# Patient Record
Sex: MALE | Race: WHITE | NOT HISPANIC OR LATINO | Employment: FULL TIME | URBAN - METROPOLITAN AREA
[De-identification: names, ages, dates, MRNs, and addresses within clinical notes are randomized per-mention and may not be internally consistent; named-entity substitution may affect disease eponyms.]

---

## 2023-07-10 LAB — URINE CULTURE: NORMAL

## 2023-08-08 ENCOUNTER — TELEMEDICINE (OUTPATIENT)
Dept: PRIMARY CARE | Facility: CLINIC | Age: 83
End: 2023-08-08
Payer: MEDICARE

## 2023-08-08 DIAGNOSIS — F41.9 ANXIETY: Primary | ICD-10-CM

## 2023-08-08 NOTE — PROGRESS NOTES
Pt was unable to connect to video.  I called patient and informed him to call previous PCP, or new PCP or go to urgent care for issues with his anxiety med.    Farzana Caro, DO

## 2023-08-24 ENCOUNTER — APPOINTMENT (OUTPATIENT)
Dept: PRIMARY CARE | Facility: CLINIC | Age: 83
End: 2023-08-24
Payer: MEDICARE

## 2023-09-01 PROBLEM — R30.0 DYSURIA: Status: ACTIVE | Noted: 2023-09-01

## 2023-09-01 PROBLEM — N13.8 BPH WITH OBSTRUCTION/LOWER URINARY TRACT SYMPTOMS: Status: ACTIVE | Noted: 2023-09-01

## 2023-09-01 PROBLEM — R39.15 URGENCY OF URINATION: Status: ACTIVE | Noted: 2023-09-01

## 2023-09-01 PROBLEM — I10 ESSENTIAL HYPERTENSION: Status: ACTIVE | Noted: 2023-09-01

## 2023-09-01 PROBLEM — R33.9 URINARY RETENTION: Status: ACTIVE | Noted: 2023-09-01

## 2023-09-01 PROBLEM — N20.0 NEPHROLITHIASIS: Status: ACTIVE | Noted: 2023-09-01

## 2023-09-01 PROBLEM — B36.9 FUNGAL DERMATITIS: Status: ACTIVE | Noted: 2023-09-01

## 2023-09-01 PROBLEM — N40.1 BPH WITH OBSTRUCTION/LOWER URINARY TRACT SYMPTOMS: Status: ACTIVE | Noted: 2023-09-01

## 2023-09-01 PROBLEM — I11.9 LVH (LEFT VENTRICULAR HYPERTROPHY) DUE TO HYPERTENSIVE DISEASE: Status: ACTIVE | Noted: 2023-09-01

## 2023-09-01 PROBLEM — C61 CARCINOMA OF PROSTATE (MULTI): Status: ACTIVE | Noted: 2023-09-01

## 2023-09-01 PROBLEM — H93.11 TINNITUS OF RIGHT EAR: Status: ACTIVE | Noted: 2023-09-01

## 2023-09-01 PROBLEM — N35.919 URETHRAL STRICTURE: Status: ACTIVE | Noted: 2023-09-01

## 2023-09-01 PROBLEM — K22.2 LOWER ESOPHAGEAL RING (SCHATZKI): Status: ACTIVE | Noted: 2023-09-01

## 2023-09-01 PROBLEM — J21.9 BRONCHIOLITIS: Status: ACTIVE | Noted: 2023-09-01

## 2023-09-01 PROBLEM — R60.0 BILATERAL LOWER EXTREMITY EDEMA: Status: ACTIVE | Noted: 2023-09-01

## 2023-09-01 PROBLEM — F41.9 ANXIETY: Status: ACTIVE | Noted: 2023-09-01

## 2023-09-01 RX ORDER — TAMSULOSIN HYDROCHLORIDE 0.4 MG/1
1 CAPSULE ORAL NIGHTLY
COMMUNITY
Start: 2022-06-14

## 2023-09-01 RX ORDER — SERTRALINE HYDROCHLORIDE 25 MG/1
1 TABLET, FILM COATED ORAL
COMMUNITY
Start: 2023-08-22

## 2023-09-01 RX ORDER — FLUOXETINE 10 MG/1
1 TABLET ORAL
COMMUNITY
Start: 2023-08-08 | End: 2023-11-06

## 2023-09-01 RX ORDER — CLONAZEPAM 0.5 MG/1
1 TABLET ORAL NIGHTLY PRN
COMMUNITY
Start: 2023-08-22 | End: 2023-10-09 | Stop reason: ALTCHOICE

## 2023-09-01 RX ORDER — AMLODIPINE BESYLATE 5 MG/1
1 TABLET ORAL DAILY
COMMUNITY

## 2023-09-01 RX ORDER — ALPRAZOLAM 0.25 MG/1
1 TABLET ORAL 2 TIMES DAILY PRN
COMMUNITY
Start: 2023-04-19

## 2023-09-01 RX ORDER — ALPRAZOLAM 0.5 MG/1
1 TABLET ORAL 2 TIMES DAILY PRN
COMMUNITY
Start: 2012-07-24

## 2023-09-01 RX ORDER — TADALAFIL 5 MG/1
1 TABLET ORAL DAILY
COMMUNITY
Start: 2022-06-13

## 2023-09-01 RX ORDER — AMLODIPINE BESYLATE 2.5 MG/1
1 TABLET ORAL
COMMUNITY
Start: 2023-07-25

## 2023-09-01 RX ORDER — CLOTRIMAZOLE AND BETAMETHASONE DIPROPIONATE 10; .64 MG/G; MG/G
CREAM TOPICAL 2 TIMES DAILY
COMMUNITY
Start: 2016-01-18

## 2023-09-01 RX ORDER — ROSUVASTATIN CALCIUM 20 MG/1
1 TABLET, COATED ORAL DAILY
COMMUNITY
Start: 2023-05-18

## 2023-09-01 RX ORDER — OMEPRAZOLE 40 MG/1
1 CAPSULE, DELAYED RELEASE ORAL 2 TIMES DAILY
COMMUNITY

## 2023-10-09 ENCOUNTER — OFFICE VISIT (OUTPATIENT)
Dept: UROLOGY | Facility: CLINIC | Age: 83
End: 2023-10-09
Payer: MEDICARE

## 2023-10-09 VITALS — WEIGHT: 196.4 LBS | TEMPERATURE: 97.9 F | BODY MASS INDEX: 28.12 KG/M2 | HEIGHT: 70 IN

## 2023-10-09 DIAGNOSIS — R33.9 URINARY RETENTION: ICD-10-CM

## 2023-10-09 DIAGNOSIS — N40.1 BPH WITH OBSTRUCTION/LOWER URINARY TRACT SYMPTOMS: ICD-10-CM

## 2023-10-09 DIAGNOSIS — N35.819 OTHER STRICTURE OF URETHRA IN MALE: Primary | ICD-10-CM

## 2023-10-09 DIAGNOSIS — N13.8 BPH WITH OBSTRUCTION/LOWER URINARY TRACT SYMPTOMS: ICD-10-CM

## 2023-10-09 PROCEDURE — 1125F AMNT PAIN NOTED PAIN PRSNT: CPT | Performed by: STUDENT IN AN ORGANIZED HEALTH CARE EDUCATION/TRAINING PROGRAM

## 2023-10-09 PROCEDURE — 99214 OFFICE O/P EST MOD 30 MIN: CPT | Performed by: STUDENT IN AN ORGANIZED HEALTH CARE EDUCATION/TRAINING PROGRAM

## 2023-10-09 PROCEDURE — 1160F RVW MEDS BY RX/DR IN RCRD: CPT | Performed by: STUDENT IN AN ORGANIZED HEALTH CARE EDUCATION/TRAINING PROGRAM

## 2023-10-09 PROCEDURE — 1159F MED LIST DOCD IN RCRD: CPT | Performed by: STUDENT IN AN ORGANIZED HEALTH CARE EDUCATION/TRAINING PROGRAM

## 2023-10-09 PROCEDURE — 51798 US URINE CAPACITY MEASURE: CPT | Performed by: STUDENT IN AN ORGANIZED HEALTH CARE EDUCATION/TRAINING PROGRAM

## 2023-10-09 PROCEDURE — 51736 URINE FLOW MEASUREMENT: CPT | Performed by: STUDENT IN AN ORGANIZED HEALTH CARE EDUCATION/TRAINING PROGRAM

## 2023-10-09 PROCEDURE — 1036F TOBACCO NON-USER: CPT | Performed by: STUDENT IN AN ORGANIZED HEALTH CARE EDUCATION/TRAINING PROGRAM

## 2023-10-09 ASSESSMENT — PATIENT HEALTH QUESTIONNAIRE - PHQ9
2. FEELING DOWN, DEPRESSED OR HOPELESS: NOT AT ALL
SUM OF ALL RESPONSES TO PHQ9 QUESTIONS 1 AND 2: 0
1. LITTLE INTEREST OR PLEASURE IN DOING THINGS: NOT AT ALL

## 2023-10-09 ASSESSMENT — ENCOUNTER SYMPTOMS
DEPRESSION: 0
OCCASIONAL FEELINGS OF UNSTEADINESS: 0
LOSS OF SENSATION IN FEET: 0

## 2023-10-09 NOTE — PROGRESS NOTES
Ji presents for a follow up evaluation.  The patient’s EMR has been reviewed.  Lives in [place], [place].  Accompanied by Wife.   Previously evaluated by Dr. Magana, Dr. Meeks and Dejah Garcia.   H/o urethral stricture with retention, nephrolithiasis, and prostate cancer s/p cyber knife. Post-op PSAs have remained undetectable (as of April 2022).    SUBJECTIVE:  HPI   TODAY (10/09/23)  Reports his stream has been good, feels he empties well.  Denies any recent UTIs, dysuria, gross hematuria, fevers or chills.  States from a urinary standpoint he has been stable.   No new or worsening complaints.     Uroflow results:  Qmax: 9 mL/s, normal curve  VV: 29 mL    TO REVIEW: Last evaluation (06/20/22)  H/o urethral stricture and urinary retention   S/p initial dilation with Dr. Magana in 2019. (16Fr)  Bulbomembranous stricture identified at that time.   S/p dilation and indwelling Kaufman catheter. (06/15/22)   Referred to me for cystoscopy and TOV.     Cystoscopy performed:  Urethra: no evidence of stricture until the proximal bulbar urethra where dilated stricture visualized for ~2 cm from the membranous into the proximal bulb.   Prostate: non-obstructing, no hypertrophy.   Bladder: small capacity, moderate trabeculation, possible small diverticulum. Otherwise, no tumors, stones or other lesions noted.     Urine Culture (no units)   Date Value   07/11/2023 NO GROWTH     Past medical, surgical, family and social history in the chart was reviewed and is accurate including any additions to what is in this HPI.    Review of Systems   Constitutional: denies any unintentional weight loss or change in strength.  Integumentary: denies any rashes or pruritus.  Eyes: denies any double vision or eye pain.  Ear/Nose/Mouth/Throat: denies any nosebleeds or gum bleeds.  Cardiovascular: denies any chest pain or syncope.  Respiratory: denies hemoptysis.  Gastrointestinal: denies nausea or vomiting.  Musculoskeletal: denies muscle  cramping or weakness.  Neurologic: denies convulsions or seizures.  Hematologic/Lymphatic: denies bleeding tendencies.  Endocrine: denies heat/cold intolerance.  All other systems have been reviewed and are negative unless otherwise noted in the HPI.    OBJECTIVE:  Visit Vitals  Temp 36.6 °C (97.9 °F)     Physical Exam   Constitutional: No obvious distress.  Eyes: Non-injected conjunctiva, sclera clear, EOMI.  Ears/Nose/Mouth/Throat: No obvious drainage per ears or nose.  Cardiovascular: Extremities are warm and well perfused. No edema, cyanosis or pallor.  Respiratory: No audible wheezing/stridor; respirations do not appear labored.  Gastrointestinal: Abdomen soft, not distended.  Musculoskeletal: Normal ROM of extremities.  Skin: No obvious rashes or open sores.  Neurologic: Alert and oriented, CN 2-12 grossly intact.  Psychiatric: Answers questions appropriately with normal affect.  Hematologic/Lymphatic/Immunologic: No obvious bruises or sites of spontaneous bleeding.  Genitourinary: No CVA tenderness, bladder not palpable.     Labs and imaging:  Lab Results   Component Value Date    WBC 9.1 07/10/2023    HGB 12.9 (L) 07/10/2023    HCT 37.6 (L) 07/10/2023     07/10/2023    CHOL 180 04/22/2022    TRIG 51 04/22/2022    HDL 63.5 04/22/2022    ALT 17 07/10/2023    AST 32 07/10/2023     07/10/2023    K 4.6 07/10/2023     07/10/2023    CREATININE 0.94 07/10/2023    BUN 19 07/10/2023    CO2 22 07/10/2023    PSA <0.10 04/22/2022    INR 1.0 11/21/2021     ASSESSMENT:  Problem List Items Addressed This Visit       BPH with obstruction/lower urinary tract symptoms    Urethral stricture - Primary    Relevant Orders    Follow Up In Urology    Urinary retention      PLAN:  We discussed the natural history of urethral stricture disease in detail, including the high likelihood of recurrence given two or more previous endoscopic procedures. Advised on signs and symptoms of stricture recurrence, he will monitor  and call if this is occurring.     Denies any significant recurrence since last dilation.  Plan to RTC in 6 months for reassessment and flow/pvr.     All questions were answered to the patient’s satisfaction.  Patient agrees with the plan and wishes to proceed.  Follow-up will be scheduled appropriately.     Scribed for Dr. Chad Bardales by Preet Pierre.  I, Dr. Chad Bardales have personally reviewed and agreed with the information entered by the Virtual Scribe. 10/09/23.

## 2024-02-01 ENCOUNTER — APPOINTMENT (OUTPATIENT)
Dept: UROLOGY | Facility: HOSPITAL | Age: 84
End: 2024-02-01
Payer: MEDICARE

## 2024-05-13 ENCOUNTER — OFFICE VISIT (OUTPATIENT)
Dept: UROLOGY | Facility: CLINIC | Age: 84
End: 2024-05-13
Payer: MEDICARE

## 2024-05-13 VITALS — TEMPERATURE: 98 F

## 2024-05-13 DIAGNOSIS — C61 PROSTATE CANCER (MULTI): Primary | ICD-10-CM

## 2024-05-13 DIAGNOSIS — N35.819 OTHER STRICTURE OF URETHRA IN MALE: ICD-10-CM

## 2024-05-13 PROCEDURE — 99214 OFFICE O/P EST MOD 30 MIN: CPT | Performed by: STUDENT IN AN ORGANIZED HEALTH CARE EDUCATION/TRAINING PROGRAM

## 2024-05-13 PROCEDURE — 1160F RVW MEDS BY RX/DR IN RCRD: CPT | Performed by: STUDENT IN AN ORGANIZED HEALTH CARE EDUCATION/TRAINING PROGRAM

## 2024-05-13 PROCEDURE — G2211 COMPLEX E/M VISIT ADD ON: HCPCS | Performed by: STUDENT IN AN ORGANIZED HEALTH CARE EDUCATION/TRAINING PROGRAM

## 2024-05-13 PROCEDURE — 1126F AMNT PAIN NOTED NONE PRSNT: CPT | Performed by: STUDENT IN AN ORGANIZED HEALTH CARE EDUCATION/TRAINING PROGRAM

## 2024-05-13 PROCEDURE — 1159F MED LIST DOCD IN RCRD: CPT | Performed by: STUDENT IN AN ORGANIZED HEALTH CARE EDUCATION/TRAINING PROGRAM

## 2024-05-13 ASSESSMENT — PAIN SCALES - GENERAL: PAINLEVEL: 0-NO PAIN

## 2024-05-13 NOTE — PROGRESS NOTES
Scribed for Dr. Chad Bardales by Preet Pierre. I, Dr. Chad Bardales have personally reviewed and agreed with the information entered by the Virtual Scribe. 05/13/24.    ASSESSMENT:  Problem List Items Addressed This Visit       Urethral stricture    Relevant Orders    Measure post void residual (Completed)     Other Visit Diagnoses       Prostate cancer (Multi)    -  Primary    Relevant Orders    PSA           PLAN:  #Urethral stricture - s/p dilation  We discussed the natural history of urethral stricture disease in detail, including the high likelihood of recurrence given two or more previous endoscopic procedures. Advised on signs and symptoms of stricture recurrence, he will monitor and call if this is occurring.      Denies any significant recurrent symptoms since last dilation.  Stream remains okay and feels he empties well.   RTC in 1 year for reassessment and flow/pvr.     Advised to call if he develops recurrent obstructive symptoms, retention or UTI.   In which case will schedule follow up sooner.     #Prostate cancer - s/p CyberKnife  #PSA screening  Last PSA was undetectable but in April 2022.   Elects to repeat and follow up virtually for results.     All questions were answered to the patient’s satisfaction.  Patient agrees with the plan and wishes to proceed.  Continue follow-up for ongoing care of his chronic medical conditions.       History of Present Illness (HPI):  Ji presents for a follow up visit.  The patient’s EMR has been reviewed.  Lives in [place], [place].  Accompanied by Wife.   Previously evaluated by Dr. Magana, Dr. Meeks and Dejah Garcia.     H/o urethral stricture with retention, nephrolithiasis, and prostate cancer s/p cyber knife. Post-op PSAs have remained undetectable (as of April 2022).    S/p initial dilation with Dr. Magana in 2019. (16Fr)  Bulbomembranous stricture identified at that time.   S/p dilation and indwelling Kaufman catheter. (06/15/22)   Initially was referred to  me for cystoscopy and TOV.   S/p cystoscopy with me (06/20/22), see below.   Since then, has been doing well, urinary symptoms stable.   Presents today for follow up and flow/pvr.     TODAY: (05/13/24)  From a urinary standpoint.   Reports he has been doing well overall.   Stream remains good, and empties well.   No recent infections, gross hematuria, fevers or chills.     Uroflow results:   Prolonged and blunted curve  Qmax: 8 mL/s  VV: 141 mL  PVR: 104 mL    TO REVIEW: Last visit (10/09/23)  Reports his stream has been good, feels he empties well.  Denies any recent UTIs, dysuria, gross hematuria, fevers or chills.  States from a urinary standpoint he has been stable.   No new or worsening complaints.      Uroflow results:  Qmax: 9 mL/s, normal curve  VV: 29 mL     TO REVIEW: (06/20/22)  H/o urethral stricture and urinary retention   S/p initial dilation with Dr. Magana in 2019. (16Fr)  Bulbomembranous stricture identified at that time.   S/p dilation and indwelling Kaufman catheter. (06/15/22)   Initially was referred to me for cystoscopy and TOV.      Cystoscopy performed:  Urethra: no evidence of stricture until the proximal bulbar urethra where dilated stricture visualized for ~2 cm from the membranous into the proximal bulb.   Prostate: non-obstructing, no hypertrophy.   Bladder: small capacity, moderate trabeculation, possible small diverticulum. Otherwise, no tumors, stones or other lesions noted.   Past Medical History:   Diagnosis Date    Personal history of other diseases of urinary system 07/15/2016    History of hematuria    Personal history of urinary (tract) infections 10/10/2013    Personal history of urinary tract infection     Past Surgical History:   Procedure Laterality Date    OTHER SURGICAL HISTORY  07/03/2013    Surgery    OTHER SURGICAL HISTORY  07/03/2013    Needle Biopsy Of Prostate     Family History   Problem Relation Name Age of Onset    Hypertension Father      Hypertension Paternal  Grandmother       Social History     Tobacco Use   Smoking Status Never   Smokeless Tobacco Never     Current Outpatient Medications   Medication Sig Dispense Refill    ALPRAZolam (Xanax) 0.25 mg tablet Take 1 tablet (0.25 mg) by mouth 2 times a day as needed for anxiety.      ALPRAZolam (Xanax) 0.5 mg tablet Take 1 tablet (0.5 mg) by mouth 2 times a day as needed.      amLODIPine (Norvasc) 2.5 mg tablet Take 1 tablet (2.5 mg) by mouth once daily. Take with amlodipine/olmesartan 5/20 once daily      amLODIPine (Norvasc) 5 mg tablet Take 1 tablet (5 mg) by mouth once daily.      clotrimazole-betamethasone (Lotrisone) cream Apply topically twice a day.  APPLY SPARINGLY TO THE AFFECTED AREA(S)      FLUoxetine (PROzac) 10 mg tablet Take 1 tablet (10 mg) by mouth once daily.      omeprazole (PriLOSEC) 40 mg DR capsule Take 1 capsule (40 mg) by mouth 2 times a day.      rosuvastatin (Crestor) 20 mg tablet Take 1 tablet (20 mg) by mouth once daily.      sertraline (Zoloft) 25 mg tablet Take 1 tablet (25 mg) by mouth once daily.      tadalafil (Cialis) 5 mg tablet Take 1 tablet (5 mg) by mouth once daily.      tamsulosin (Flomax) 0.4 mg 24 hr capsule Take 1 capsule (0.4 mg) by mouth once daily at bedtime.       No current facility-administered medications for this visit.     Allergies   Allergen Reactions    Other Unknown     sneezing     Past medical, surgical, family and social history in the chart was reviewed and is accurate including any additions to what is in this HPI.    REVIEW OF SYSTEMS (ROS):   Constitutional: denies any unintentional weight loss or change in strength.  Integumentary: denies any rashes or pruritus.  Eyes: denies any double vision or eye pain.  Ear/Nose/Mouth/Throat: denies any nosebleeds or gum bleeds.  Cardiovascular: denies any chest pain or syncope.  Respiratory: denies hemoptysis.  Gastrointestinal: denies nausea or vomiting.  Musculoskeletal: denies muscle cramping or weakness.  Neurologic:  denies convulsions or seizures.  Hematologic/Lymphatic: denies bleeding tendencies.  Endocrine: denies heat/cold intolerance.  All other systems have been reviewed and are negative unless otherwise noted in the HPI.     OBJECTIVE:  Visit Vitals  Temp 36.7 °C (98 °F)     PHYSICAL EXAM:  Constitutional: No obvious distress.  Eyes: Non-injected conjunctiva, sclera clear, EOMI.  Ears/Nose/Mouth/Throat: No obvious drainage per ears or nose.  Cardiovascular: Extremities are warm and well perfused. No edema, cyanosis or pallor.  Respiratory: No audible wheezing/stridor; respirations do not appear labored.  Gastrointestinal: Abdomen soft, not distended.  Musculoskeletal: Normal ROM of extremities.  Skin: No obvious rashes or open sores.  Neurologic: Alert and oriented, CN 2-12 grossly intact.  Psychiatric: Answers questions appropriately with normal affect.  Hematologic/Lymphatic/Immunologic: No obvious bruises or sites of spontaneous bleeding.  Genitourinary: No CVA tenderness, bladder not palpable.     LABS & IMAGING:  Lab Results   Component Value Date    WBC 9.1 07/10/2023    HGB 12.9 (L) 07/10/2023    HCT 37.6 (L) 07/10/2023     07/10/2023    CHOL 180 04/22/2022    TRIG 51 04/22/2022    HDL 63.5 04/22/2022    ALT 17 07/10/2023    AST 32 07/10/2023     07/10/2023    K 4.6 07/10/2023     07/10/2023    CREATININE 0.94 07/10/2023    BUN 19 07/10/2023    CO2 22 07/10/2023    PSA <0.10 04/22/2022    INR 1.0 11/21/2021     Scribed for Dr. Chad Bardales by Preet Pierre.  I, Dr. Chad Bardales have personally reviewed and agreed with the information entered by the Virtual Scribe. 05/13/24.

## 2025-05-12 ENCOUNTER — APPOINTMENT (OUTPATIENT)
Dept: UROLOGY | Facility: CLINIC | Age: 85
End: 2025-05-12
Payer: MEDICARE

## 2025-05-15 ENCOUNTER — APPOINTMENT (OUTPATIENT)
Dept: UROLOGY | Facility: CLINIC | Age: 85
End: 2025-05-15
Payer: MEDICARE

## 2025-06-30 ENCOUNTER — APPOINTMENT (OUTPATIENT)
Dept: UROLOGY | Facility: CLINIC | Age: 85
End: 2025-06-30
Payer: MEDICARE

## 2025-08-18 ENCOUNTER — APPOINTMENT (OUTPATIENT)
Dept: UROLOGY | Facility: CLINIC | Age: 85
End: 2025-08-18
Payer: MEDICARE

## 2025-10-07 ENCOUNTER — APPOINTMENT (OUTPATIENT)
Dept: UROLOGY | Facility: CLINIC | Age: 85
End: 2025-10-07
Payer: MEDICARE